# Patient Record
Sex: FEMALE | Race: BLACK OR AFRICAN AMERICAN
[De-identification: names, ages, dates, MRNs, and addresses within clinical notes are randomized per-mention and may not be internally consistent; named-entity substitution may affect disease eponyms.]

---

## 2018-02-19 ENCOUNTER — HOSPITAL ENCOUNTER (EMERGENCY)
Dept: HOSPITAL 54 - ER | Age: 28
Discharge: HOME | End: 2018-02-19
Payer: COMMERCIAL

## 2018-02-19 VITALS — WEIGHT: 170 LBS | BODY MASS INDEX: 30.12 KG/M2 | HEIGHT: 63 IN

## 2018-02-19 VITALS — SYSTOLIC BLOOD PRESSURE: 112 MMHG | DIASTOLIC BLOOD PRESSURE: 59 MMHG

## 2018-02-19 DIAGNOSIS — R11.2: ICD-10-CM

## 2018-02-19 DIAGNOSIS — R10.30: Primary | ICD-10-CM

## 2018-02-19 LAB
BASOPHILS # BLD AUTO: 0 /CMM (ref 0–0.2)
BASOPHILS NFR BLD AUTO: 0.3 % (ref 0–2)
BILIRUB UR QL STRIP: (no result)
BUN SERPL-MCNC: 8 MG/DL (ref 7–18)
CALCIUM SERPL-MCNC: 9.5 MG/DL (ref 8.5–10.1)
CHLORIDE SERPL-SCNC: 99 MMOL/L (ref 98–107)
CO2 SERPL-SCNC: 24 MMOL/L (ref 21–32)
CREAT SERPL-MCNC: 0.7 MG/DL (ref 0.6–1.3)
EOSINOPHIL # BLD AUTO: 0 /CMM (ref 0–0.7)
EOSINOPHIL NFR BLD AUTO: 0.4 % (ref 0–6)
GLUCOSE SERPL-MCNC: 83 MG/DL (ref 74–106)
HCT VFR BLD AUTO: 35 % (ref 33–45)
HGB BLD-MCNC: 11.9 G/DL (ref 11.5–14.8)
KETONES UR STRIP-MCNC: >=160 MG/DL
LYMPHOCYTES NFR BLD AUTO: 1.6 /CMM (ref 0.8–4.8)
LYMPHOCYTES NFR BLD AUTO: 21.8 % (ref 20–44)
MCH RBC QN AUTO: 29 PG (ref 26–33)
MCHC RBC AUTO-ENTMCNC: 34 G/DL (ref 31–36)
MCV RBC AUTO: 87 FL (ref 82–100)
MONOCYTES NFR BLD AUTO: 0.7 /CMM (ref 0.1–1.3)
MONOCYTES NFR BLD AUTO: 9.1 % (ref 2–12)
NEUTROPHILS # BLD AUTO: 5 /CMM (ref 1.8–8.9)
NEUTROPHILS NFR BLD AUTO: 68.4 % (ref 43–81)
PH UR STRIP: 6 [PH] (ref 5–8)
PLATELET # BLD AUTO: 248 /CMM (ref 150–450)
POTASSIUM SERPL-SCNC: 3.8 MMOL/L (ref 3.5–5.1)
PROT UR QL STRIP: 100 MG/DL
RBC # BLD AUTO: 4.07 MIL/UL (ref 4–5.2)
RBC #/AREA URNS HPF: (no result) /HPF (ref 0–2)
RDW COEFFICIENT OF VARIATION: 14.1 (ref 11.5–15)
SODIUM SERPL-SCNC: 133 MMOL/L (ref 136–145)
UROBILINOGEN UR STRIP-MCNC: 1 EU/DL
WBC #/AREA URNS HPF: (no result) /HPF (ref 0–3)
WBC NRBC COR # BLD AUTO: 7.3 K/UL (ref 4.3–11)

## 2018-02-19 PROCEDURE — Z7610: HCPCS

## 2018-02-19 PROCEDURE — 84702 CHORIONIC GONADOTROPIN TEST: CPT

## 2018-02-19 PROCEDURE — 99284 EMERGENCY DEPT VISIT MOD MDM: CPT

## 2018-02-19 PROCEDURE — 85025 COMPLETE CBC W/AUTO DIFF WBC: CPT

## 2018-02-19 PROCEDURE — 36415 COLL VENOUS BLD VENIPUNCTURE: CPT

## 2018-02-19 PROCEDURE — 96361 HYDRATE IV INFUSION ADD-ON: CPT

## 2018-02-19 PROCEDURE — A4606 OXYGEN PROBE USED W OXIMETER: HCPCS

## 2018-02-19 PROCEDURE — 96374 THER/PROPH/DIAG INJ IV PUSH: CPT

## 2018-02-19 PROCEDURE — 80048 BASIC METABOLIC PNL TOTAL CA: CPT

## 2018-02-19 PROCEDURE — 81001 URINALYSIS AUTO W/SCOPE: CPT

## 2018-02-19 NOTE — NUR
Patient does not wish to proceed with medical care recommended by Edwin GOSS. Patient given information related to possible complications, up to and 
including death, which could occur as a result of leaving the hospital at this 
time. Patient verbalizes understanding of risks involved due to leaving against 
medical advice. Patient has signed AMA form.

## 2018-02-19 NOTE — NUR
PT CAME IN WITH C/O ABD CRAMPING W/ N/V X THIS AM. SEEN BY MD FOR EVAL. VSS. IV 
ACCESS STARTED. BLOOD DRAWN FOR LABS. SAFETY AND COMFORT MEASURES PROVIDED. 
WILL MONITOR.